# Patient Record
Sex: MALE | Race: AMERICAN INDIAN OR ALASKA NATIVE
[De-identification: names, ages, dates, MRNs, and addresses within clinical notes are randomized per-mention and may not be internally consistent; named-entity substitution may affect disease eponyms.]

---

## 2018-04-22 ENCOUNTER — HOSPITAL ENCOUNTER (EMERGENCY)
Dept: HOSPITAL 5 - ED | Age: 68
Discharge: HOME | End: 2018-04-22
Payer: MEDICARE

## 2018-04-22 VITALS — DIASTOLIC BLOOD PRESSURE: 77 MMHG | SYSTOLIC BLOOD PRESSURE: 126 MMHG

## 2018-04-22 DIAGNOSIS — L50.9: Primary | ICD-10-CM

## 2018-04-22 PROCEDURE — 99283 EMERGENCY DEPT VISIT LOW MDM: CPT

## 2018-04-22 NOTE — EMERGENCY DEPARTMENT REPORT
HPI





- General


Chief Complaint: Skin Rash


Time Seen by Provider: 04/22/18 10:22





- [facility]


HPI: 








Patient is a 68-year-old male who presents for evaluation of a rash.  The 

patient reports generalized red circular rash throughout the body, constant and 

severe for the past one day, migratory, steady in quality, worsened with hot 

showers.  The patient denies lip swelling, tongue swelling, dyspnea, wheezing, 

neck stiffness, dysphagia, stridor, drooling, difficulty tolerating secretions, 

dysphonia, hoarseness of voice,  abdominal pain.








ED Past Medical Hx





- Past Medical History


Hx Hypertension: Yes





- Social History


Smoking Status: Never Smoker





- Medications


Home Medications: 


 Home Medications











 Medication  Instructions  Recorded  Confirmed  Last Taken  Type


 


diphenhydrAMINE [Benadryl CAP] 50 mg PO Q8HR PRN #30 capsule 04/22/18  Unknown 

Rx


 


predniSONE [Deltasone] 20 mg PO QDAY #5 tab 04/22/18  Unknown Rx














ED Review of Systems


ROS: 


Stated complaint: ALLERGIC REACTION


Other details as noted in HPI





Constitutional: denies: fever


ENT: denies: throat or neck pain


Respiratory: denies: cough, shortness of breath


Cardiovascular: denies: chest pain


Endocrine: denies unexplained weight loss or gain


Gastrointestinal: denies: abdominal pain, nausea


Genitourinary: denies: dysuria


Musculoskeletal: denies: leg swelling


Skin: reports rash


Neurological: denies: headache


Hematological/Lymphatic: denies: easy bleeding or easy bruising  


Psych: denies sadness or hopelessness








Physical Exam





- Physical Exam


Vital Signs: 


 Vital Signs











  04/22/18





  09:02


 


Temperature 98.2 F


 


Pulse Rate 82


 


Respiratory 16





Rate 


 


Blood Pressure 126/77


 


O2 Sat by Pulse 98





Oximetry 











Physical Exam: 








General: well-nourished, well-developed, no acute distress


Head: Normocephalic, atraumatic


Eyes: normal sclera


ENT: Mucous membranes are pink and moist, no tongue swelling, no uvula swelling

, no uvula deviation, no tonsillar swelling or deviation, no pooling of 

secretions in the posterior oropharynx


Neck: trachea midline, neck supple, No neck stiffness, no cervical adenopathy


Respiratory: Breath sounds equal bilaterally, no wheezing, rales, or rhonchi


Cardio: S1 and S2 present, no murmurs, rubs, gallops, capillary refill is brisk


Abdomen: Normoactive bowel sounds, soft abdomen, no tenderness


Musc: No pitting edema


Skin: Multiple circular raised blanching red macular papules present throughout 

the torso and extremities bilaterally


Neuro: no facial drooping, normal speech


Psych: Normal affect














ED Course


 Vital Signs











  04/22/18





  09:02


 


Temperature 98.2 F


 


Pulse Rate 82


 


Respiratory 16





Rate 


 


Blood Pressure 126/77


 


O2 Sat by Pulse 98





Oximetry 














ED Medical Decision Making





- Medical Decision Making





The patient was seen and examined by myself.  The patient is placed on a 

cardiac monitor and continuous pulse ox. On initial evaluation, the patient was 

found to be in no distress. Evaluation orders were placed.  Exam findings are 

consistent with acute urticaria.  The patient is given prednisone and Benadryl. 

The patient was reevaluated and reported that their symptoms were markedly 

improved.  The patient is stable for discharge with outpatient follow-up.  The 

patient is given follow-up and return instructions.  The patient expressed 

understanding and agreed with the plan.  The patient is discharged in stable 

condition.


Critical care attestation.: 


If time is entered above; I have spent that time in minutes in the direct care 

of this critically ill patient, excluding procedure time.








ED Disposition


Clinical Impression: 


 Hives, Full body hives





Bed bug bite


Qualifiers:


 Encounter type: initial encounter Qualified Code(s): W57.XXXA - Bitten or 

stung by nonvenomous insect and other nonvenomous arthropods, initial encounter





Disposition: DC-01 TO HOME OR SELFCARE


Is pt being admited?: No


Does the pt Need Aspirin: No


Condition: Stable


Instructions:  Insect Bite or Sting (ED), Urticaria (ED)


Referrals: 


MARCIO STARR MD [Primary Care Provider] - 3-5 Days


Time of Disposition: 10:34